# Patient Record
Sex: MALE | Race: BLACK OR AFRICAN AMERICAN | Employment: UNEMPLOYED | URBAN - METROPOLITAN AREA
[De-identification: names, ages, dates, MRNs, and addresses within clinical notes are randomized per-mention and may not be internally consistent; named-entity substitution may affect disease eponyms.]

---

## 2019-05-17 ENCOUNTER — HOSPITAL ENCOUNTER (EMERGENCY)
Age: 59
Discharge: HOME | End: 2019-05-17

## 2019-05-17 VITALS
TEMPERATURE: 98 F | WEIGHT: 140 LBS | BODY MASS INDEX: 21.97 KG/M2 | HEIGHT: 67 IN | OXYGEN SATURATION: 99 % | HEART RATE: 90 BPM | RESPIRATION RATE: 14 BRPM | SYSTOLIC BLOOD PRESSURE: 168 MMHG | DIASTOLIC BLOOD PRESSURE: 80 MMHG

## 2019-05-17 DIAGNOSIS — K02.9 PAIN DUE TO DENTAL CARIES: Primary | ICD-10-CM

## 2019-05-17 PROCEDURE — 99282 EMERGENCY DEPT VISIT SF MDM: CPT

## 2019-05-17 ASSESSMENT — PAIN SCALES - GENERAL: PAINLEVEL_OUTOF10: 8

## 2019-05-17 ASSESSMENT — PAIN DESCRIPTION - FREQUENCY: FREQUENCY: CONTINUOUS

## 2019-05-17 ASSESSMENT — PAIN DESCRIPTION - PAIN TYPE: TYPE: ACUTE PAIN

## 2019-05-17 ASSESSMENT — PAIN DESCRIPTION - PROGRESSION: CLINICAL_PROGRESSION: GRADUALLY WORSENING

## 2019-05-17 ASSESSMENT — PAIN DESCRIPTION - LOCATION: LOCATION: TEETH

## 2019-05-17 ASSESSMENT — PAIN DESCRIPTION - DESCRIPTORS: DESCRIPTORS: ACHING

## 2019-05-17 ASSESSMENT — PAIN DESCRIPTION - ORIENTATION: ORIENTATION: RIGHT;UPPER

## 2019-05-17 NOTE — ED PROVIDER NOTES
reviewed. Physical exam:  Constitutional: The patient is comfortable, alert and oriented x3, well appearing, non toxic in NAD. Head: Atraumatic and normocephalic. Eyes: No discharge from the eyes the sclerae are normal.  ENT: The oropharynx is normal. No pharyngeal erythema, uvular edema, tonsillar exudates, asymmetry or trismus. Mouth is normal to inspection  With the exception of a pain on percussion of the tooth noted above. There is no evidence of facial asymmetry or abscess formation. Floor of the mouth is soft. No tenderness in the submental or submandibular space. No tongue elevation. Has dental caries noted and stated tooth no evidence of abscess formation. Neck: The neck demonstrates normal range of motion. No meningeals signs are present. No stridor. Respiratory/chest: The chest is nontender. Breath sounds are normal. no respiratory distress is noted  Cardiovascular: Heart shows a regular rate and rhythm no murmurs no rubs no gallops. Skin: The skin exam shows no evidence of rashes  Neuro: GCS is 15  Lymphatic: No cervical lymphadenopathy       -------------------------------------------------- RESULTS -------------------------------------------------  I have personally reviewed all laboratory and imaging results for this patient. Results are listed below. LABS:  No results found for this visit on 05/17/19. RADIOLOGY:  Interpreted by Radiologist.  No orders to display       Dental clinic was called patient will be sent over for evaluation.     Medical Decision Making: Exam and history c/w  dental pain without evidence of gross infection. At this time we will  the patient on following up in dental clinic and provide pain relief.       Impression:   1) Dental Pain  2) Dental Caries    Disposition: Discharge  Condition: Stable               Aubrie Andrews, YANICK - CNP  05/17/19 0813

## 2021-01-28 LAB
MEASLES IMMUNE (IGG): NORMAL
MUMPS AB IGG: NORMAL
RUBELLA ANTIBODY IGG: NORMAL

## 2021-01-29 LAB — VARICELLA-ZOSTER VIRUS AB, IGG: NORMAL

## 2021-09-17 ENCOUNTER — HOSPITAL ENCOUNTER (OUTPATIENT)
Age: 61
Discharge: HOME | End: 2021-09-19
Payer: MEDICAID

## 2021-09-17 ENCOUNTER — HOSPITAL ENCOUNTER (OUTPATIENT)
Dept: GENERAL RADIOLOGY | Age: 61
Discharge: HOME | End: 2021-09-19
Payer: COMMERCIAL

## 2021-09-17 ENCOUNTER — HOSPITAL ENCOUNTER (OUTPATIENT)
Age: 61
Discharge: HOME | End: 2021-09-17
Payer: COMMERCIAL

## 2021-09-17 DIAGNOSIS — J44.9 OBSTRUCTIVE CHRONIC BRONCHITIS WITHOUT EXACERBATION (HCC): ICD-10-CM

## 2021-09-17 LAB
ALBUMIN SERPL-MCNC: 4.2 G/DL (ref 3.5–5.2)
ALP BLD-CCNC: 65 U/L (ref 40–129)
ALT SERPL-CCNC: 15 U/L (ref 0–40)
ANION GAP SERPL CALCULATED.3IONS-SCNC: 11 MMOL/L (ref 7–16)
AST SERPL-CCNC: 17 U/L (ref 0–39)
BASOPHILS ABSOLUTE: 0.02 E9/L (ref 0–0.2)
BASOPHILS RELATIVE PERCENT: 0.3 % (ref 0–2)
BILIRUB SERPL-MCNC: 0.8 MG/DL (ref 0–1.2)
BUN BLDV-MCNC: 11 MG/DL (ref 6–23)
CALCIUM SERPL-MCNC: 9.2 MG/DL (ref 8.6–10.2)
CHLORIDE BLD-SCNC: 103 MMOL/L (ref 98–107)
CHOLESTEROL, TOTAL: 163 MG/DL (ref 0–199)
CO2: 26 MMOL/L (ref 22–29)
CREAT SERPL-MCNC: 1.1 MG/DL (ref 0.7–1.2)
EOSINOPHILS ABSOLUTE: 0.15 E9/L (ref 0.05–0.5)
EOSINOPHILS RELATIVE PERCENT: 2.5 % (ref 0–6)
GFR AFRICAN AMERICAN: >60
GFR NON-AFRICAN AMERICAN: >60 ML/MIN/1.73
GLUCOSE BLD-MCNC: 84 MG/DL (ref 74–99)
HCT VFR BLD CALC: 43.2 % (ref 37–54)
HDLC SERPL-MCNC: 74 MG/DL
HEMOGLOBIN: 13.7 G/DL (ref 12.5–16.5)
IMMATURE GRANULOCYTES #: 0.02 E9/L
IMMATURE GRANULOCYTES %: 0.3 % (ref 0–5)
LDL CHOLESTEROL CALCULATED: 80 MG/DL (ref 0–99)
LYMPHOCYTES ABSOLUTE: 1.58 E9/L (ref 1.5–4)
LYMPHOCYTES RELATIVE PERCENT: 26.7 % (ref 20–42)
MCH RBC QN AUTO: 27.5 PG (ref 26–35)
MCHC RBC AUTO-ENTMCNC: 31.7 % (ref 32–34.5)
MCV RBC AUTO: 86.7 FL (ref 80–99.9)
MONOCYTES ABSOLUTE: 0.48 E9/L (ref 0.1–0.95)
MONOCYTES RELATIVE PERCENT: 8.1 % (ref 2–12)
NEUTROPHILS ABSOLUTE: 3.66 E9/L (ref 1.8–7.3)
NEUTROPHILS RELATIVE PERCENT: 62.1 % (ref 43–80)
PDW BLD-RTO: 12.4 FL (ref 11.5–15)
PLATELET # BLD: 278 E9/L (ref 130–450)
PMV BLD AUTO: 9 FL (ref 7–12)
POTASSIUM SERPL-SCNC: 3.7 MMOL/L (ref 3.5–5)
PROSTATE SPECIFIC ANTIGEN: 1.32 NG/ML (ref 0–4)
RBC # BLD: 4.98 E12/L (ref 3.8–5.8)
SODIUM BLD-SCNC: 140 MMOL/L (ref 132–146)
TOTAL PROTEIN: 7 G/DL (ref 6.4–8.3)
TRIGL SERPL-MCNC: 43 MG/DL (ref 0–149)
TSH SERPL DL<=0.05 MIU/L-ACNC: 1.39 UIU/ML (ref 0.27–4.2)
VITAMIN D 25-HYDROXY: 20 NG/ML (ref 30–100)
VLDLC SERPL CALC-MCNC: 9 MG/DL
WBC # BLD: 5.9 E9/L (ref 4.5–11.5)

## 2021-09-17 PROCEDURE — 84403 ASSAY OF TOTAL TESTOSTERONE: CPT

## 2021-09-17 PROCEDURE — 85025 COMPLETE CBC W/AUTO DIFF WBC: CPT

## 2021-09-17 PROCEDURE — G0103 PSA SCREENING: HCPCS

## 2021-09-17 PROCEDURE — 71046 X-RAY EXAM CHEST 2 VIEWS: CPT

## 2021-09-17 PROCEDURE — 84270 ASSAY OF SEX HORMONE GLOBUL: CPT

## 2021-09-17 PROCEDURE — 80053 COMPREHEN METABOLIC PANEL: CPT

## 2021-09-17 PROCEDURE — 80061 LIPID PANEL: CPT

## 2021-09-17 PROCEDURE — 82306 VITAMIN D 25 HYDROXY: CPT

## 2021-09-17 PROCEDURE — 84443 ASSAY THYROID STIM HORMONE: CPT

## 2021-09-17 PROCEDURE — 36415 COLL VENOUS BLD VENIPUNCTURE: CPT

## 2021-09-21 LAB
SEX HORMONE BINDING GLOBULIN: 74 NMOL/L (ref 11–80)
TESTOSTERONE FREE-NONMALE: 122.5 PG/ML (ref 47–244)
TESTOSTERONE TOTAL: 923 NG/DL (ref 220–1000)

## 2022-09-09 ENCOUNTER — HOSPITAL ENCOUNTER (OUTPATIENT)
Age: 62
Discharge: HOME | End: 2022-09-09
Payer: COMMERCIAL

## 2022-09-09 LAB
ALBUMIN SERPL-MCNC: 3.8 G/DL (ref 3.5–5.2)
ALP BLD-CCNC: 71 U/L (ref 40–129)
ALT SERPL-CCNC: 17 U/L (ref 0–40)
ANION GAP SERPL CALCULATED.3IONS-SCNC: 10 MMOL/L (ref 7–16)
AST SERPL-CCNC: 17 U/L (ref 0–39)
BASOPHILS ABSOLUTE: 0.02 E9/L (ref 0–0.2)
BASOPHILS RELATIVE PERCENT: 0.4 % (ref 0–2)
BILIRUB SERPL-MCNC: 0.5 MG/DL (ref 0–1.2)
BUN BLDV-MCNC: 11 MG/DL (ref 6–23)
CALCIUM SERPL-MCNC: 9.1 MG/DL (ref 8.6–10.2)
CHLORIDE BLD-SCNC: 102 MMOL/L (ref 98–107)
CHOLESTEROL, TOTAL: 140 MG/DL (ref 0–199)
CO2: 28 MMOL/L (ref 22–29)
CREAT SERPL-MCNC: 0.9 MG/DL (ref 0.7–1.2)
EOSINOPHILS ABSOLUTE: 0.19 E9/L (ref 0.05–0.5)
EOSINOPHILS RELATIVE PERCENT: 4.1 % (ref 0–6)
GFR AFRICAN AMERICAN: >60
GFR NON-AFRICAN AMERICAN: >60 ML/MIN/1.73
GLUCOSE BLD-MCNC: 90 MG/DL (ref 74–99)
HCT VFR BLD CALC: 41 % (ref 37–54)
HDLC SERPL-MCNC: 64 MG/DL
HEMOGLOBIN: 13 G/DL (ref 12.5–16.5)
IMMATURE GRANULOCYTES #: 0.01 E9/L
IMMATURE GRANULOCYTES %: 0.2 % (ref 0–5)
LDL CHOLESTEROL CALCULATED: 68 MG/DL (ref 0–99)
LYMPHOCYTES ABSOLUTE: 1.28 E9/L (ref 1.5–4)
LYMPHOCYTES RELATIVE PERCENT: 27.6 % (ref 20–42)
MCH RBC QN AUTO: 28 PG (ref 26–35)
MCHC RBC AUTO-ENTMCNC: 31.7 % (ref 32–34.5)
MCV RBC AUTO: 88.2 FL (ref 80–99.9)
MONOCYTES ABSOLUTE: 0.72 E9/L (ref 0.1–0.95)
MONOCYTES RELATIVE PERCENT: 15.6 % (ref 2–12)
NEUTROPHILS ABSOLUTE: 2.41 E9/L (ref 1.8–7.3)
NEUTROPHILS RELATIVE PERCENT: 52.1 % (ref 43–80)
PDW BLD-RTO: 11.9 FL (ref 11.5–15)
PLATELET # BLD: 302 E9/L (ref 130–450)
PMV BLD AUTO: 8.5 FL (ref 7–12)
POTASSIUM SERPL-SCNC: 4 MMOL/L (ref 3.5–5)
PROSTATE SPECIFIC ANTIGEN: 2.31 NG/ML (ref 0–4)
RBC # BLD: 4.65 E12/L (ref 3.8–5.8)
SODIUM BLD-SCNC: 140 MMOL/L (ref 132–146)
TOTAL PROTEIN: 6.7 G/DL (ref 6.4–8.3)
TRIGL SERPL-MCNC: 39 MG/DL (ref 0–149)
VITAMIN D 25-HYDROXY: 21 NG/ML (ref 30–100)
VLDLC SERPL CALC-MCNC: 8 MG/DL
WBC # BLD: 4.6 E9/L (ref 4.5–11.5)

## 2022-09-09 PROCEDURE — 82306 VITAMIN D 25 HYDROXY: CPT

## 2022-09-09 PROCEDURE — 85025 COMPLETE CBC W/AUTO DIFF WBC: CPT

## 2022-09-09 PROCEDURE — 36415 COLL VENOUS BLD VENIPUNCTURE: CPT

## 2022-09-09 PROCEDURE — 80061 LIPID PANEL: CPT

## 2022-09-09 PROCEDURE — G0103 PSA SCREENING: HCPCS

## 2022-09-09 PROCEDURE — 80053 COMPREHEN METABOLIC PANEL: CPT

## 2023-01-23 ENCOUNTER — HOSPITAL ENCOUNTER (OUTPATIENT)
Dept: GENERAL RADIOLOGY | Age: 63
Discharge: HOME | End: 2023-01-25
Payer: COMMERCIAL

## 2023-01-23 ENCOUNTER — HOSPITAL ENCOUNTER (OUTPATIENT)
Age: 63
Discharge: HOME | End: 2023-01-25
Payer: COMMERCIAL

## 2023-01-23 DIAGNOSIS — R52 PAIN: ICD-10-CM

## 2023-01-23 PROCEDURE — 73560 X-RAY EXAM OF KNEE 1 OR 2: CPT

## 2023-03-13 ENCOUNTER — HOSPITAL ENCOUNTER (OUTPATIENT)
Age: 63
Discharge: HOME | End: 2023-03-13
Payer: COMMERCIAL

## 2023-03-13 LAB
CHOLESTEROL, TOTAL: 150 MG/DL (ref 0–199)
HDLC SERPL-MCNC: 77 MG/DL
LDL CHOLESTEROL CALCULATED: 65 MG/DL (ref 0–99)
TRIGL SERPL-MCNC: 38 MG/DL (ref 0–149)
VLDLC SERPL CALC-MCNC: 8 MG/DL

## 2023-03-13 PROCEDURE — 82652 VIT D 1 25-DIHYDROXY: CPT

## 2023-03-13 PROCEDURE — 36415 COLL VENOUS BLD VENIPUNCTURE: CPT

## 2023-03-13 PROCEDURE — 80061 LIPID PANEL: CPT

## 2023-03-16 LAB — 1,25(OH)2D3 SERPL-MCNC: 36.4 PG/ML (ref 19.9–79.3)

## 2023-10-09 ENCOUNTER — HOSPITAL ENCOUNTER (OUTPATIENT)
Age: 63
Discharge: HOME | End: 2023-10-09
Payer: COMMERCIAL

## 2023-10-09 PROCEDURE — G0103 PSA SCREENING: HCPCS

## 2023-10-09 PROCEDURE — 36415 COLL VENOUS BLD VENIPUNCTURE: CPT

## 2023-10-10 LAB — PSA SERPL-MCNC: 2.43 NG/ML (ref 0–4)

## 2024-03-07 ENCOUNTER — HOSPITAL ENCOUNTER (OUTPATIENT)
Dept: GENERAL RADIOLOGY | Age: 64
Discharge: HOME | End: 2024-03-09
Payer: COMMERCIAL

## 2024-03-07 ENCOUNTER — HOSPITAL ENCOUNTER (OUTPATIENT)
Age: 64
Discharge: HOME | End: 2024-03-09
Payer: COMMERCIAL

## 2024-03-07 DIAGNOSIS — R05.9 COUGH, UNSPECIFIED TYPE: ICD-10-CM

## 2024-03-07 PROCEDURE — 71046 X-RAY EXAM CHEST 2 VIEWS: CPT

## 2024-07-11 ENCOUNTER — HOSPITAL ENCOUNTER (EMERGENCY)
Age: 64
Discharge: HOME | End: 2024-07-11
Attending: EMERGENCY MEDICINE
Payer: COMMERCIAL

## 2024-07-11 ENCOUNTER — APPOINTMENT (OUTPATIENT)
Dept: ULTRASOUND IMAGING | Age: 64
End: 2024-07-11
Payer: COMMERCIAL

## 2024-07-11 VITALS
HEIGHT: 67 IN | RESPIRATION RATE: 16 BRPM | TEMPERATURE: 97.9 F | WEIGHT: 145 LBS | SYSTOLIC BLOOD PRESSURE: 166 MMHG | BODY MASS INDEX: 22.76 KG/M2 | DIASTOLIC BLOOD PRESSURE: 92 MMHG | OXYGEN SATURATION: 98 % | HEART RATE: 72 BPM

## 2024-07-11 DIAGNOSIS — M79.89 LEG SWELLING: Primary | ICD-10-CM

## 2024-07-11 PROCEDURE — 6370000000 HC RX 637 (ALT 250 FOR IP): Performed by: EMERGENCY MEDICINE

## 2024-07-11 PROCEDURE — 99284 EMERGENCY DEPT VISIT MOD MDM: CPT

## 2024-07-11 PROCEDURE — 93971 EXTREMITY STUDY: CPT

## 2024-07-11 RX ORDER — IBUPROFEN 800 MG/1
800 TABLET ORAL ONCE
Status: COMPLETED | OUTPATIENT
Start: 2024-07-11 | End: 2024-07-11

## 2024-07-11 RX ADMIN — IBUPROFEN 800 MG: 800 TABLET, FILM COATED ORAL at 07:39

## 2024-07-11 ASSESSMENT — PAIN DESCRIPTION - ORIENTATION: ORIENTATION: LEFT;POSTERIOR

## 2024-07-11 ASSESSMENT — PAIN DESCRIPTION - DESCRIPTORS: DESCRIPTORS: ACHING

## 2024-07-11 ASSESSMENT — PAIN SCALES - GENERAL
PAINLEVEL_OUTOF10: 8
PAINLEVEL_OUTOF10: 7

## 2024-07-11 ASSESSMENT — PAIN DESCRIPTION - LOCATION
LOCATION: LEG
LOCATION: KNEE

## 2024-07-11 ASSESSMENT — LIFESTYLE VARIABLES
HOW OFTEN DO YOU HAVE A DRINK CONTAINING ALCOHOL: 2-3 TIMES A WEEK
HOW MANY STANDARD DRINKS CONTAINING ALCOHOL DO YOU HAVE ON A TYPICAL DAY: 3 OR 4

## 2024-07-11 NOTE — ED PROVIDER NOTES
HPI:  7/11/24,   Time: 7:17 AM EDT         Koby Lee is a 63 y.o. male presenting to the ED for evaluation of pain and swelling in the left lower leg.  He states approximately 1 week ago he had an object come down on his left knee at work.  He developed some swelling but was not very concerned.  He was doing fine but then noticed that over the past day or 2 his entire left lower leg has become swollen.  Complains of pain in his calf and pain with weightbearing.  No other injuries.  No history of surgeries.  No history of trauma otherwise.    ROS:   Pertinent positives and negatives are stated within HPI, all other systems reviewed and are negative.  --------------------------------------------- PAST HISTORY ---------------------------------------------  Past Medical History:  has no past medical history on file.    Past Surgical History:  has no past surgical history on file.    Social History:  reports that he has been smoking. He has never used smokeless tobacco. He reports current alcohol use. He reports that he does not use drugs.    Family History: family history is not on file.     The patient’s home medications have been reviewed.    Allergies: Patient has no known allergies.    -------------------------------------------------- RESULTS -------------------------------------------------  All laboratory and radiology results have been personally reviewed by myself   LABS:  No results found for this visit on 07/11/24.    RADIOLOGY:  Interpreted by Radiologist.  Vascular duplex lower extremity venous left   Final Result   There is no evidence of deep vein thrombosis.             ------------------------- NURSING NOTES AND VITALS REVIEWED ---------------------------   The nursing notes within the ED encounter and vital signs as below have been reviewed.   BP (!) 166/92   Pulse 72   Temp 97.9 °F (36.6 °C) (Oral)   Resp 16   Ht 1.702 m (5' 7\")   Wt 65.8 kg (145 lb)   SpO2 98%   BMI 22.71 kg/m²

## 2024-07-11 NOTE — DISCHARGE INSTR - COC
Continuity of Care Form    Patient Name: Koby Lee   :  1960  MRN:  [id number]    Admit date:  2024  Discharge date:  ***    Code Status Order: No Order   Advance Directives:     Admitting Physician:  No admitting provider for patient encounter.  PCP: Gurvinder Barnett MD    Discharging Nurse: ***  Discharging Hospital Unit/Room#:   Discharging Unit Phone Number: ***    Emergency Contact:   Extended Emergency Contact Information  Primary Emergency Contact: Colleen Baum  Mobile Phone: [phone]  Relation: Brother/Sister    Past Surgical History:  No past surgical history on file.    Immunization History:     There is no immunization history on file for this patient.    Active Problems:  There is no problem list on file for this patient.      Isolation/Infection:   Isolation            No Isolation          Patient Infection Status       None to display            Nurse Assessment:  Last Vital Signs: BP (!) 166/92   Pulse 72   Temp 97.9 °F (36.6 °C) (Oral)   Resp 16   Ht 1.702 m (5' 7\")   Wt 65.8 kg (145 lb)   SpO2 98%   BMI 22.71 kg/m²     Last documented pain score (0-10 scale): Pain Level: 7  Last Weight:   Wt Readings from Last 1 Encounters:   24 65.8 kg (145 lb)     Mental Status:  {IP PT MENTAL STATUS:}    IV Access:  { MERE IV ACCESS:012771938}    Nursing Mobility/ADLs:  Walking   {CHP DME ADLs:746825295}  Transfer  {CHP DME ADLs:828508262}  Bathing  {CHP DME ADLs:417459996}  Dressing  {CHP DME ADLs:085945165}  Toileting  {CHP DME ADLs:849082578}  Feeding  {CHP DME ADLs:305732668}  Med Admin  {P DME ADLs:643436611}  Med Delivery   { MERE MED Delivery:704668243}    Wound Care Documentation and Therapy:        Elimination:  Continence:   Bowel: {YES / NO:}  Bladder: {YES / NO:}  Urinary Catheter: {Urinary Catheter:310642092}   Colostomy/Ileostomy/Ileal Conduit: {YES / NO:}       Date of Last BM: ***  No intake or output data in the 24 hours  Level of Care:50212} for {GREATER/LESS:355267581} 30 days.     Update Admission H&P: {CHP DME Changes in HandP:950328173}    PHYSICIAN SIGNATURE:  {Esignature:116973250}

## 2024-07-15 ENCOUNTER — HOSPITAL ENCOUNTER (OUTPATIENT)
Age: 64
Discharge: HOME | End: 2024-07-17
Payer: COMMERCIAL

## 2024-07-15 ENCOUNTER — HOSPITAL ENCOUNTER (OUTPATIENT)
Age: 64
Discharge: HOME | End: 2024-07-15
Payer: COMMERCIAL

## 2024-07-15 LAB
BASOPHILS # BLD: 0.02 K/UL (ref 0–0.2)
BASOPHILS NFR BLD: 0 % (ref 0–2)
EOSINOPHIL # BLD: 0.18 K/UL (ref 0.05–0.5)
EOSINOPHILS RELATIVE PERCENT: 3 % (ref 0–6)
ERYTHROCYTE [DISTWIDTH] IN BLOOD BY AUTOMATED COUNT: 11.9 % (ref 11.5–15)
HCT VFR BLD AUTO: 40 % (ref 37–54)
HGB BLD-MCNC: 12.5 G/DL (ref 12.5–16.5)
IMM GRANULOCYTES # BLD AUTO: <0.03 K/UL (ref 0–0.58)
IMM GRANULOCYTES NFR BLD: 0 % (ref 0–5)
LYMPHOCYTES NFR BLD: 1.62 K/UL (ref 1.5–4)
LYMPHOCYTES RELATIVE PERCENT: 27 % (ref 20–42)
MCH RBC QN AUTO: 28 PG (ref 26–35)
MCHC RBC AUTO-ENTMCNC: 31.3 G/DL (ref 32–34.5)
MCV RBC AUTO: 89.5 FL (ref 80–99.9)
MONOCYTES NFR BLD: 0.76 K/UL (ref 0.1–0.95)
MONOCYTES NFR BLD: 13 % (ref 2–12)
NEUTROPHILS NFR BLD: 57 % (ref 43–80)
NEUTS SEG NFR BLD: 3.49 K/UL (ref 1.8–7.3)
PLATELET # BLD AUTO: 281 K/UL (ref 130–450)
PMV BLD AUTO: 8.6 FL (ref 7–12)
RBC # BLD AUTO: 4.47 M/UL (ref 3.8–5.8)
URATE SERPL-MCNC: 4.1 MG/DL (ref 3.4–7)
WBC OTHER # BLD: 6.1 K/UL (ref 4.5–11.5)

## 2024-07-15 PROCEDURE — 84550 ASSAY OF BLOOD/URIC ACID: CPT

## 2024-07-15 PROCEDURE — 85025 COMPLETE CBC W/AUTO DIFF WBC: CPT

## 2024-07-18 ENCOUNTER — HOSPITAL ENCOUNTER (OUTPATIENT)
Age: 64
Discharge: HOME | End: 2024-07-20
Payer: COMMERCIAL

## 2024-07-18 ENCOUNTER — HOSPITAL ENCOUNTER (OUTPATIENT)
Dept: GENERAL RADIOLOGY | Age: 64
Discharge: HOME | End: 2024-07-20
Payer: COMMERCIAL

## 2024-07-18 DIAGNOSIS — M25.562 LEFT KNEE PAIN, UNSPECIFIED CHRONICITY: ICD-10-CM

## 2024-07-18 PROCEDURE — 73562 X-RAY EXAM OF KNEE 3: CPT

## 2024-08-23 ENCOUNTER — HOSPITAL ENCOUNTER (OUTPATIENT)
Dept: PHYSICAL THERAPY | Age: 64
Setting detail: THERAPIES SERIES
Discharge: HOME | End: 2024-08-23
Payer: COMMERCIAL

## 2024-08-23 ENCOUNTER — OFFICE VISIT (OUTPATIENT)
Dept: ORTHOPEDIC SURGERY | Age: 64
End: 2024-08-23

## 2024-08-23 VITALS — HEIGHT: 67 IN | BODY MASS INDEX: 22.76 KG/M2 | WEIGHT: 145 LBS

## 2024-08-23 DIAGNOSIS — S86.812A STRAIN OF CALF MUSCLE, LEFT, INITIAL ENCOUNTER: ICD-10-CM

## 2024-08-23 DIAGNOSIS — M25.462 EFFUSION OF LEFT KNEE: Primary | ICD-10-CM

## 2024-08-23 DIAGNOSIS — M25.562 ACUTE PAIN OF LEFT KNEE: ICD-10-CM

## 2024-08-23 PROCEDURE — 97161 PT EVAL LOW COMPLEX 20 MIN: CPT | Performed by: PHYSICAL THERAPIST

## 2024-08-23 ASSESSMENT — ENCOUNTER SYMPTOMS
ABDOMINAL DISTENTION: 0
ALLERGIC/IMMUNOLOGIC NEGATIVE: 1
SHORTNESS OF BREATH: 0
EYE DISCHARGE: 0

## 2024-08-23 ASSESSMENT — PAIN SCALES - GENERAL: PAINLEVEL_OUTOF10: 4

## 2024-08-23 ASSESSMENT — PAIN DESCRIPTION - ORIENTATION: ORIENTATION: LEFT

## 2024-08-23 ASSESSMENT — PAIN DESCRIPTION - PAIN TYPE: TYPE: ACUTE PAIN

## 2024-08-23 ASSESSMENT — PAIN DESCRIPTION - DESCRIPTORS: DESCRIPTORS: ACHING;BURNING

## 2024-08-23 ASSESSMENT — PAIN DESCRIPTION - LOCATION: LOCATION: KNEE

## 2024-08-23 NOTE — PROGRESS NOTES
Physical Therapy: Initial Evaluation    Patient: Koby Lee (64 y.o. male)   Examination Date: 2024  Plan of Care Certification Period: 2024 to        :  1960 ;    Confirmed: Yes MRN: [id number]  CSN: [id number]   Insurance: Payor: [employer] CMS / Plan: Sitemasher / Product Type: *No Product type* /   Insurance ID: [id number] - (Worker's Comp) Secondary Insurance (if applicable):    Referring Physician: Ha Rivas DO     PCP: Gurvinder Barnett MD Visits to Date/Visits Approved:     No Show/Cancelled Appts:   /       Medical Diagnosis: No admission diagnoses are documented for this encounter.    Treatment Diagnosis:       PERTINENT MEDICAL HISTORY           Medical History: Chart Reviewed: Yes History reviewed. No pertinent past medical history.  Surgical History: History reviewed. No pertinent surgical history.    Medications: No current outpatient medications on file.  Allergies: Patient has no known allergies.      SUBJECTIVE EXAMINATION      ,           Subjective History: Onset Date: 24  Subjective: pt presents to therapy with c/o L knee pain due to being struck on the inside of the leg by falling lid 24; x-ray done - for fx, + for mild degenerative changes seen within the left knee with moderate-sized joint  effusion; no PMH for L knee injury/sx per pt; MEDS helping with pain; tells PT that symptoms have decreased over time; no c/o N/T across L LE but has issue with buckling at times; sleep seems fine; currently off work; ortho consult today and surgeon agreeable with PT course of action; RTD for follow-up 24  Additional Pertinent Hx (if applicable):            Learning/Language: Learning  Does the patient/guardian have any barriers to learning?: No barriers  What is the preferred language of the patient/guardian?: English     Pain Screening    Pain Screening  Patient Currently in Pain: Yes  Pain Assessment: 0-10  Pain Level: 4  Pain Type: Acute pain  Pain

## 2024-08-23 NOTE — PROGRESS NOTES
Koby Lee (:  1960) is a 64 y.o. male,New patient, here for evaluation of the following chief complaint(s):  Knee Pain (Left Knee, [facility] injury DOI 2024, Was seen in ER, states a lid hit him on the medial side of the knee.  Has seen Occupational Health)      Assessment & Plan   ASSESSMENT/PLAN:  1. Effusion of left knee  2. Acute pain of left knee  3. Strain of calf muscle, left, initial encounter    This is a 64 y.o. year old male with Effusion of left knee [M25.462].  I discussed a variety of treatment options with the patient today including observation, NSAID, low impact exercise, weight loss, physical therapy and injections. The patient would like to proceed with injection to help with pain and swelling.    C9 request for cortisone injection    Agree with initiation of PT which is to start this afternoon    Return for injection when approved.         Subjective   SUBJECTIVE/OBJECTIVE:  HPI    64-year-old male here today to discuss his left knee.  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  He states one of the lids which weighed approximately 20 to 30 pounds slid and hit the inside of his left leg.  At that time he had fairly significant pain.  He was seen in the emergency department where x-rays were obtained which I will discuss below.  He notes some improvement in his pain but continues to have medial sided knee pain as well as calf pain.  He notes swelling compared to the opposite side.  He states he is due to start physical therapy this afternoon.  He is here today to discuss further treatment options.    History reviewed. No pertinent past medical history.  History reviewed. No pertinent surgical history.   History reviewed. No pertinent family history.   Social History     Tobacco Use    Smoking status: Every Day     Current packs/day: 1.00     Types: Cigarettes    Smokeless tobacco: Never   Substance Use Topics    Alcohol use: Yes

## 2024-08-23 NOTE — PROGRESS NOTES
[facility]                Phone: [phone]   Fax: [phone]    Physical Therapy Daily Treatment Note  Date:  2024    Patient Name:  Koby Lee    :  1960  MRN: [id number]    Evaluating therapist:  ADIEL Watters     (24)  Restrictions/Precautions:    Diagnosis:  L knee sp/st  Treatment Diagnosis:    Insurance/Certification information:  [employer]  (24)  Referring Physician:  MADELINE Rivas  Plan of care signed (Y/N):    Visit# / total visits:    Pain level: 4/10   Time In:  Time Out:    Subjective:      Exercises:  Exercise/Equipment Resistance/Repetitions Other comments   bike              seated hip abd                       flex            Total Gym squats with ball            NK flex/ext            toe raises     step ups             side                                                         Other Therapeutic Activities:      Home Exercise Program:  provided 24    Manual Treatments:      Modalities:  IFC/MH to L knee PRN     Timed Code Treatment Minutes:      Total Treatment Minutes:      Treatment/Activity Tolerance:  [] Patient tolerated treatment well [] Patient limited by fatique  [] Patient limited by pain  [] Patient limited by other medical complications  [] Other:     Prognosis: [] Good [] Fair  [] Poor    Patient Requires Follow-up: [] Yes  [] No    Plan:   [] Continue per plan of care [] Alter current plan (see comments)  [] Plan of care initiated [] Hold pending MD visit [] Discharge  Plan for Next Session:      See Weekly Progress Note: []  Yes  []  No  Next due:               Time-in Time-out Total Time   82690  Evaluation Low Complexity  1503  1529  26   89207  Evaluation Med Complexity         34737  Evaluation High Complexity         82244  Cervical traction         72247  Ther Ex      45585  Neuro Re-ed        72914  Ther Activities        40177  Manual Therapy       31238  E-stim       09524  Ultrasound         32870

## 2024-08-26 ENCOUNTER — HOSPITAL ENCOUNTER (OUTPATIENT)
Dept: PHYSICAL THERAPY | Age: 64
Setting detail: THERAPIES SERIES
Discharge: HOME | End: 2024-08-26
Payer: COMMERCIAL

## 2024-08-26 NOTE — PROGRESS NOTES
[facility]                Phone: [phone]  Fax: [phone]    Physical Therapy  Cancellation/No-show Note      Patient Name:  oKby Lee  :  1960   Date:  2024      For today's appointment patient:  [x]  Cancelled  []  Rescheduled appointment  []  No-show     Reason given by patient:  []  Patient ill  []  Conflicting appointment  []  No transportation    []  Conflict with work  []  No reason given  [x]  Other:       Comments:   Patient confused scheduled time for PT appt.  He is rescheduled for tomorrow morning.        Electronically signed by:  BALJEET WASHINGTON, JANE, PTA [id number]                                            Evaluating therapist:  ADIEL Watters    24  Referring Physician:  MADELINE Rivas  Diagnosis:  L knee sp/st  Restrictions/Precautions:    Insurance/Certification:  [employer]atan  24  Plan of care signed (Y/N):    Visit# / total visits:     Pain level:  /10     Time In:       Time Out:      Subjective:  Patient presents for first scheduled treatment session following initial evaluation.      He reports pain in L knee  /10      Exercises:  Exercise/Equipment Resistance/Repetitions Other comments   Recumbent bike  8 min L3           Step up fwd 2 x10  6\"                  side 2 x10  6\"         Standing HS curls 3 x10           Standing hip abd 2 x15 ea L/R    Standing hip/knee flex/ext 2 x15 ea L/R         Calf raises - wedge 3 x10              stretches - wedge 3 x20s                           Total Gym BL squats w/ ball 2 x15  L7           Seated hip abd 2 x15  ptb                       add 2 x15  ball                    Objective:   Ther. exercise/activity as listed per flow sheet above.        Assessment:  Patient performs exercises with good effort and pacing.       Evaluation Findings:    c/o L knee pain due to being struck on the inside of the leg by falling lid 24   c/o pain across L knee with all prolonged activities,

## 2024-08-27 ENCOUNTER — HOSPITAL ENCOUNTER (OUTPATIENT)
Dept: PHYSICAL THERAPY | Age: 64
Setting detail: THERAPIES SERIES
Discharge: HOME | End: 2024-08-27
Payer: COMMERCIAL

## 2024-08-27 PROCEDURE — 97110 THERAPEUTIC EXERCISES: CPT

## 2024-08-27 PROCEDURE — 97530 THERAPEUTIC ACTIVITIES: CPT

## 2024-08-27 NOTE — PROGRESS NOTES
[facility]                Phone: [phone]   Fax: [phone]    Physical Therapy Daily Treatment Note    Date:  2024    Patient Name:  Koby Lee    :  1960  MRN: [id number]    Evaluating therapist:  ADIEL Watters    24  Referring Physician:  MADELINE Rivas  Diagnosis:  L knee sp/st  Restrictions/Precautions:    Insurance/Certification:  [place]  24  Plan of care signed (Y/N):    Visit# / total visits:     Pain level: 0-4/10     Time In:     1125  Time Out:  1207    Subjective:  Patient presents for first scheduled treatment session following initial evaluation.  He reports pain in no pain in L knee at rest prior to session this morning.    He reports pain as much as 4/10 in the past few days.         Exercises:    Exercise/Equipment   Resistance/Repetitions   Comments     Recumbent bike    8 min L3             Rocker board balance shifts   20x ea 3-way               Step up fwd   2 x10  6\"                     side  nt               Standing HS curls   2 x10                 Standing hip abd   2 x10 ea L/R        Standing hip/knee flex/ext   2 x10 ea L/R               Calf raises - wedge   3 x10                 stretches - wedge  nt                                 Total Gym BL squats w/ ball   2 x15  L7             Standing TKE   2 x15  red flexband            NK  knee ext   2 x10  5#                       flex   2 x10  5#                        Objective:   Ther. exercise/activity as listed per flow sheet above.    No modalities.      Assessment:  Patient performs exercises with good effort and pacing.   No pain reported during or following exercises.      Evaluation Findings:    c/o L knee pain due to being struck on the inside of the leg by falling lid 24   c/o pain across L knee with all prolonged activities, 4/10   Palpation: discomfort noted across medial aspect of L knee into medial calf belly; palpable knot noted in medial belly of

## 2024-08-28 ENCOUNTER — HOSPITAL ENCOUNTER (OUTPATIENT)
Dept: PHYSICAL THERAPY | Age: 64
Setting detail: THERAPIES SERIES
Discharge: HOME | End: 2024-08-28
Payer: COMMERCIAL

## 2024-08-28 PROCEDURE — 97110 THERAPEUTIC EXERCISES: CPT

## 2024-08-28 PROCEDURE — 97530 THERAPEUTIC ACTIVITIES: CPT

## 2024-08-28 NOTE — PROGRESS NOTES
[facility]                Phone: [phone]   Fax: [phone]    Physical Therapy Daily Treatment Note    Date:  2024    Patient Name:  Koby Lee    :  1960  MRN: [id number]    Evaluating therapist:  ADIEL Watters    24  Referring Physician:  MADELINE Rivas  Diagnosis:  L knee sp/st  Restrictions/Precautions:    Insurance/Certification:  [place]  24  Plan of care signed (Y/N):    Visit# / total visits:   3/12  Pain level: 3-4/10     Time In:     1029  Time Out:   1114    Subjective:  Patient presents for second of two scheduled treatment sessions this week.    He reports pain 3-4/10 in L knee at rest prior to session this morning.      Exercises:    Exercise/Equipment   Resistance/Repetitions   Comments     Recumbent bike    8 min L3             [employer] board balance shifts   20x ea 3-way               Step up fwd   2 x10  6\"                     side  nt               Standing HS curls   3 x10   2#               Standing hip abd   2 x10 ea L/R  2#        Standing hip/knee flex/ext   2 x10 ea L/R  2#               Calf raises - wedge   3 x10                 stretches - wedge  nt                                 Total Gym BL squats w/ ball   2 x15  L7             Standing TKE   2 x15  red flexband            NK  knee ext   3 x10  5#                       flex   3 x10  5#                        Objective:   Ther. exercise/activity as listed per flow sheet above.    AROM L knee WNL  Strength L knee WFL all ranges.      Assessment:  Patient performs exercises with good effort and pacing.   Static/dynamic balance is GOOD   Endurance GOOD-    Evaluation Findings:    c/o L knee pain due to being struck on the inside of the leg by falling lid 24   c/o pain across L knee with all prolonged activities, 4/10   Palpation: discomfort noted across medial aspect of L knee into medial calf belly; palpable knot noted in medial belly of L gastroc   Gait:  independent w/ no deviations noted  AROM/strength L knee WFL all ranges  Static/dynamic balance is GOOD   Endurance FAIR+ for all prolonged activities     Goals:    Decrease pain across L knee with all prolonged activities, 0-2/10   Improve endurance for all prolonged activities to GOOD/GOOD+   Assure I with HEP for home management of condition     Home Exercise Program:  provided 8/23/24    Manual Treatments:  nt    Modalities:  nt    Treatment/Activity Tolerance:  [x] Patient tolerated treatment well [] Patient limited by fatigue  [] Patient limited by pain  [] Patient limited by other medical complications  [] Other:     Prognosis: [] Good [] Fair  [] Poor    Patient Requires Follow-up: [x] Yes  [] No    Plan:   [x] Continue per plan of care [] Alter current plan (see comments)  [] Plan of care initiated [] Hold pending MD visit [] Discharge    Plan for Next Session:       See Progress Note:  []  Yes  []  No          8/28/2024 Time-in Time-out Total Time Units   46486  Eval Low Complexity       70385  Eval Med Complexity       28091  Eval High Complexity       53596  Cervical traction       13680  Ther Ex 1030 1100 30 2   82218  Neuro Re-ed         98251  Ther Activities   1100 1115 15 1   06476  Manual Therapy        96640  E-stim        93357  Ultrasound       79586 Work Reconditioning                       Session Total 1030 1115  45  3          Electronically signed by:  BALJEET WASHINGTON ATC, PTA [id number]                                          E

## 2024-09-04 ENCOUNTER — HOSPITAL ENCOUNTER (OUTPATIENT)
Dept: PHYSICAL THERAPY | Age: 64
Setting detail: THERAPIES SERIES
Discharge: HOME | End: 2024-09-04
Payer: COMMERCIAL

## 2024-09-04 PROCEDURE — 97110 THERAPEUTIC EXERCISES: CPT

## 2024-09-04 PROCEDURE — 97530 THERAPEUTIC ACTIVITIES: CPT

## 2024-09-04 NOTE — PROGRESS NOTES
[facility]                Phone: [phone]   Fax: [phone]    Physical Therapy Daily Treatment Note    Date:  2024    Patient Name:  Koby Lee    :  1960  MRN: [id number]    Evaluating therapist:  ADIEL Watters    24  Referring Physician:  MADELINE Rivas  Diagnosis:  L knee sp/st  Restrictions/Precautions:    Insurance/Certification:  [employer]  24  Plan of care signed (Y/N):    Visit# / total visits:      Pain level: 4-5/10     Time In:     1027  Time Out:   1112    Subjective:  Patient presents for first of three scheduled treatment sessions this week.    He reports pain 4-5/10 in L knee at rest prior to session this morning.      Exercises:    Exercise/Equipment   Resistance/Repetitions   Comments     Recumbent bike    8 min L3             Rocker board balance shifts   20x ea 3-way               Step up fwd   2 x10  6\"                     side   2 x10  6\"               Standing HS curls   3 x10   2#               Standing hip abd   2 x15 ea L/R  2#        Standing hip/knee flex/ext   2 x15 ea L/R  2#               Calf raises - wedge   3 x10                                   Total Gym BL squats w/ ball   3 x15  L8             Standing TKE   2 x15  red flexband            NK  knee ext   3 x10  5#                       flex   3 x10  5#                        Objective:   Ther. exercise/activity as listed per flow sheet above.  No modalities.    AROM L knee WNL  Strength L knee WFL all ranges.      Assessment:  Patient performs exercises with good effort and pacing.   Static/dynamic balance is GOOD/GOOD+  Endurance GOOD-/GOOD    Evaluation Findings:    c/o L knee pain due to being struck on the inside of the leg by falling lid 24   c/o pain across L knee with all prolonged activities, 4/10   Palpation: discomfort noted across medial aspect of L knee into medial calf belly; palpable knot noted in medial belly of L gastroc   Gait:

## 2024-09-05 ENCOUNTER — HOSPITAL ENCOUNTER (OUTPATIENT)
Dept: PHYSICAL THERAPY | Age: 64
Setting detail: THERAPIES SERIES
Discharge: HOME | End: 2024-09-05
Payer: COMMERCIAL

## 2024-09-05 PROCEDURE — 97530 THERAPEUTIC ACTIVITIES: CPT

## 2024-09-05 PROCEDURE — 97110 THERAPEUTIC EXERCISES: CPT

## 2024-09-05 NOTE — PROGRESS NOTES
[facility]                Phone: [phone]   Fax: [phone]    Physical Therapy Daily Treatment Note    Date:  2024    Patient Name:  Koby Lee    :  1960  MRN: [id number]    Evaluating therapist:  ADIEL Watters    24  Referring Physician:  MADELINE Rivas  Diagnosis:  L knee sp/st  Restrictions/Precautions:    Insurance/Certification:  [employer]atan  24  Plan of care signed (Y/N):    Visit# / total visits:      Pain level:  2-6/10     Time In:     1030  Time Out:    1116    Subjective:  Patient presents for second of three scheduled treatment sessions this week.    He reports pain 2/10 in L knee at rest prior to session this morning and pain as much as 6/10 with activity.      Exercises:    Exercise/Equipment   Resistance/Repetitions   Comments     Recumbent bike    8 min L3             Rocker board balance shifts   20x ea 3-way               Step up fwd   2 x10  6\"                     side   2 x10  6\"               Standing HS curls   3 x10   2#               Standing hip abd   2 x15 ea L/R  2#        Standing hip/knee flex/ext   2 x15 ea L/R  2#               Calf raises - wedge   3 x10                                   Total Gym BL squats w/ ball   3 x15  L8             Standing TKE   2 x15  red flexband            NK  knee ext   3 x10  5#                       flex   3 x10  5#                        Objective:   Ther. exercise/activity as listed per flow sheet above.  No modalities.    AROM L knee WNL  Strength L knee WFL all ranges.      Assessment:  Patient performs exercises with good effort and pacing.   Static/dynamic balance is GOOD/GOOD+  Endurance GOOD-/GOOD    Evaluation Findings:    c/o L knee pain due to being struck on the inside of the leg by falling lid 24   c/o pain across L knee with all prolonged activities, 4/10   Palpation: discomfort noted across medial aspect of L knee into medial calf belly; palpable knot noted in  (4) rarely moist

## 2024-09-06 ENCOUNTER — HOSPITAL ENCOUNTER (OUTPATIENT)
Dept: PHYSICAL THERAPY | Age: 64
Setting detail: THERAPIES SERIES
Discharge: HOME | End: 2024-09-06
Payer: COMMERCIAL

## 2024-09-06 PROCEDURE — 97530 THERAPEUTIC ACTIVITIES: CPT

## 2024-09-06 PROCEDURE — 97110 THERAPEUTIC EXERCISES: CPT

## 2024-09-06 NOTE — PROGRESS NOTES
[facility]                Phone: [phone]   Fax: [phone]    Physical Therapy Daily Treatment Note    Date:  2024    Patient Name:  Koby Lee    :  1960  MRN: [id number]    Evaluating therapist:  ADILE Watters    24  Referring Physician:  MADELINE Rivas  Diagnosis:  L knee sp/st  Restrictions/Precautions:    Insurance/Certification:  [place]  24  Plan of care signed (Y/N):    Visit# / total visits:      Pain level:  4/10     Time In:    1032  Time Out:   1114     Subjective:  Patient presents for third of three scheduled treatment sessions this week.    He reports pain  4/10 in L knee at rest prior to session this morning.    Exercises:    Exercise/Equipment   Resistance/Repetitions   Comments     Recumbent bike    8 min L3             [employer] board balance shifts   20x ea 3-way               Step up fwd   2 x10  6\"                     side   2 x10  6\"               Standing HS curls   3 x10   2#               Standing hip abd   2 x15 ea L/R  2#        Standing hip/knee flex/ext   2 x15 ea L/R  2#               Calf raises - wedge   3 x10                                   Total Gym BL squats w/ ball   3 x15  L8             Standing TKE   2 x15  red flexband            NK  knee ext   3 x10  5#                       flex   3 x10  5#                        Objective:   Ther. exercise/activity as listed per flow sheet above.  No modalities.    AROM L knee WNL  Strength L knee WFL all ranges.      Assessment:  Patient performs exercises with good effort and pacing.   Static/dynamic balance is GOOD/GOOD+  Endurance  GOOD    Evaluation Findings:    c/o L knee pain due to being struck on the inside of the leg by falling lid 24   c/o pain across L knee with all prolonged activities, 4/10   Palpation: discomfort noted across medial aspect of L knee into medial calf belly; palpable knot noted in medial belly of L gastroc   Gait: independent w/ no

## 2024-09-09 ENCOUNTER — HOSPITAL ENCOUNTER (OUTPATIENT)
Dept: PHYSICAL THERAPY | Age: 64
Setting detail: THERAPIES SERIES
Discharge: HOME | End: 2024-09-09
Payer: COMMERCIAL

## 2024-09-09 PROCEDURE — G0283 ELEC STIM OTHER THAN WOUND: HCPCS

## 2024-09-09 PROCEDURE — 97110 THERAPEUTIC EXERCISES: CPT

## 2024-09-09 PROCEDURE — 97530 THERAPEUTIC ACTIVITIES: CPT

## 2024-09-11 ENCOUNTER — HOSPITAL ENCOUNTER (OUTPATIENT)
Dept: PHYSICAL THERAPY | Age: 64
Setting detail: THERAPIES SERIES
Discharge: HOME | End: 2024-09-11
Payer: COMMERCIAL

## 2024-09-11 PROCEDURE — 97530 THERAPEUTIC ACTIVITIES: CPT

## 2024-09-11 PROCEDURE — 97110 THERAPEUTIC EXERCISES: CPT

## 2024-09-12 ENCOUNTER — OFFICE VISIT (OUTPATIENT)
Dept: ORTHOPEDIC SURGERY | Age: 64
End: 2024-09-12

## 2024-09-12 DIAGNOSIS — S86.812A STRAIN OF CALF MUSCLE, LEFT, INITIAL ENCOUNTER: ICD-10-CM

## 2024-09-12 DIAGNOSIS — M17.12 PRIMARY OSTEOARTHRITIS OF LEFT KNEE: Primary | ICD-10-CM

## 2024-09-12 DIAGNOSIS — M25.462 EFFUSION OF LEFT KNEE: ICD-10-CM

## 2024-09-12 DIAGNOSIS — M25.562 ACUTE PAIN OF LEFT KNEE: ICD-10-CM

## 2024-09-12 RX ORDER — TRIAMCINOLONE ACETONIDE 40 MG/ML
80 INJECTION, SUSPENSION INTRA-ARTICULAR; INTRAMUSCULAR ONCE
Status: COMPLETED | OUTPATIENT
Start: 2024-09-12 | End: 2024-09-12

## 2024-09-12 RX ADMIN — TRIAMCINOLONE ACETONIDE 80 MG: 40 INJECTION, SUSPENSION INTRA-ARTICULAR; INTRAMUSCULAR at 10:27

## 2024-09-12 ASSESSMENT — ENCOUNTER SYMPTOMS
EYE DISCHARGE: 0
ABDOMINAL DISTENTION: 0
ALLERGIC/IMMUNOLOGIC NEGATIVE: 1
SHORTNESS OF BREATH: 0

## 2024-09-13 ENCOUNTER — APPOINTMENT (OUTPATIENT)
Dept: PHYSICAL THERAPY | Age: 64
End: 2024-09-13
Payer: COMMERCIAL

## 2024-09-17 ENCOUNTER — HOSPITAL ENCOUNTER (OUTPATIENT)
Dept: PHYSICAL THERAPY | Age: 64
Setting detail: THERAPIES SERIES
Discharge: HOME | End: 2024-09-17
Payer: COMMERCIAL

## 2024-09-17 PROCEDURE — 97110 THERAPEUTIC EXERCISES: CPT

## 2024-09-17 PROCEDURE — 97530 THERAPEUTIC ACTIVITIES: CPT

## 2024-09-18 ENCOUNTER — HOSPITAL ENCOUNTER (OUTPATIENT)
Dept: PHYSICAL THERAPY | Age: 64
Setting detail: THERAPIES SERIES
Discharge: HOME | End: 2024-09-18
Payer: COMMERCIAL

## 2024-09-18 PROCEDURE — 97530 THERAPEUTIC ACTIVITIES: CPT

## 2024-09-18 PROCEDURE — 97110 THERAPEUTIC EXERCISES: CPT

## 2024-09-20 ENCOUNTER — HOSPITAL ENCOUNTER (OUTPATIENT)
Dept: PHYSICAL THERAPY | Age: 64
Setting detail: THERAPIES SERIES
Discharge: HOME | End: 2024-09-20
Payer: COMMERCIAL

## 2024-09-20 PROCEDURE — 97110 THERAPEUTIC EXERCISES: CPT

## 2024-09-20 PROCEDURE — 97530 THERAPEUTIC ACTIVITIES: CPT

## 2024-09-24 ENCOUNTER — HOSPITAL ENCOUNTER (OUTPATIENT)
Dept: PHYSICAL THERAPY | Age: 64
Setting detail: THERAPIES SERIES
Discharge: HOME | End: 2024-09-24
Payer: COMMERCIAL

## 2024-09-24 PROCEDURE — 97530 THERAPEUTIC ACTIVITIES: CPT

## 2024-09-24 PROCEDURE — 97110 THERAPEUTIC EXERCISES: CPT

## 2024-09-25 ENCOUNTER — HOSPITAL ENCOUNTER (OUTPATIENT)
Dept: PHYSICAL THERAPY | Age: 64
Setting detail: THERAPIES SERIES
Discharge: HOME | End: 2024-09-25
Payer: COMMERCIAL

## 2024-09-25 PROCEDURE — 97530 THERAPEUTIC ACTIVITIES: CPT

## 2024-09-25 PROCEDURE — 97110 THERAPEUTIC EXERCISES: CPT

## 2024-09-27 ENCOUNTER — HOSPITAL ENCOUNTER (OUTPATIENT)
Dept: PHYSICAL THERAPY | Age: 64
Setting detail: THERAPIES SERIES
Discharge: HOME | End: 2024-09-27
Payer: COMMERCIAL

## 2024-09-27 PROCEDURE — 97530 THERAPEUTIC ACTIVITIES: CPT

## 2024-09-27 PROCEDURE — 97110 THERAPEUTIC EXERCISES: CPT

## 2024-09-30 ENCOUNTER — HOSPITAL ENCOUNTER (OUTPATIENT)
Dept: PHYSICAL THERAPY | Age: 64
Setting detail: THERAPIES SERIES
Discharge: HOME | End: 2024-09-30
Payer: COMMERCIAL

## 2024-09-30 PROCEDURE — 97530 THERAPEUTIC ACTIVITIES: CPT

## 2024-09-30 PROCEDURE — 97110 THERAPEUTIC EXERCISES: CPT

## 2024-09-30 NOTE — PROGRESS NOTES
[facility]                Phone: [phone]   Fax: [phone]    Physical Therapy Daily Treatment Note    Date:  2024    Patient Name:  Koby Lee    :  1960  MRN: [id number]    Evaluating therapist:  ADIEL Watters    24  Referring Physician:  MADELINE Rivas  Diagnosis:  L knee sp/st  Restrictions/Precautions:    Insurance/Certification:  [place]  10/10/24  Plan of care signed (Y/N):    Visit# / total visits:    15/20  Pain level:     0-4/10     Time In:    1100  Time Out:  1144    RTP  10/4/24    Subjective:  Patient presents for first of three scheduled treatment sessions this week.  He reports no  pain in L knee 0/10 at rest prior to session this morning.  He states pain was worse this past Saturday and into .  He describes a pain like a tooth ache.  He reports it was painful when he first got up this morning, 3/10.         Exercises:    Exercise/Equipment   Resistance/Repetitions   Comments     Step One   8 min              [employer] board balance shifts   20x ea 3-way               Step up fwd   2 x10  6\"                     side   2 x10  6\"               Standing HS curls   3 x10   2#               Standing hip abd   2 x15 ea L/R  2#        Standing hip/knee flex/ext   2 x15 ea L/R  2#               Calf raises - wedge   3 x10                                   Total Gym BL squats w/ ball   3 x15  L8             Standing TKE   2 x20  green flexband            NK  knee ext   3 x10  7.5#                       flex   3 x10  7.5#                        Objective:   Ther. exercise/activity as listed per flow sheet above.  No modalties.    AROM L knee WNL  Strength L knee grossly  4+/5 flex/ext    Strength L hip/ankle grossly 4+ to 5/5     Assessment:  Patient performs exercises with good effort and pacing.   Static/dynamic balance is GOOD+/NORMAL  Endurance  GOOD/GOOD+    Minimal effusion noted in L knee following session today.      Evaluation

## 2024-10-02 ENCOUNTER — HOSPITAL ENCOUNTER (OUTPATIENT)
Dept: PHYSICAL THERAPY | Age: 64
Setting detail: THERAPIES SERIES
Discharge: HOME | End: 2024-10-02
Payer: COMMERCIAL

## 2024-10-02 PROCEDURE — 97110 THERAPEUTIC EXERCISES: CPT

## 2024-10-02 PROCEDURE — 97530 THERAPEUTIC ACTIVITIES: CPT

## 2024-10-02 NOTE — PROGRESS NOTES
inside of the leg by falling lid 7/5/24   c/o pain across L knee with all prolonged activities, 4/10   Palpation: discomfort noted across medial aspect of L knee into medial calf belly; palpable knot noted in medial belly of L gastroc   Gait: independent w/ no deviations noted  AROM/strength L knee WFL all ranges  Static/dynamic balance is GOOD   Endurance FAIR+ for all prolonged activities     Goals:    Decrease pain across L knee with all prolonged activities, 0-2/10   Improve endurance for all prolonged activities to GOOD/GOOD+   Assure I with HEP for home management of condition     Home Exercise Program:  provided 8/23/24    Manual Treatments:  nt    Modalities:  nt    Treatment/Activity Tolerance:  [x] Patient tolerated treatment well [] Patient limited by fatigue  [] Patient limited by pain  [] Patient limited by other medical complications  [] Other:     Prognosis: [x] Good [] Fair  [] Poor    Patient Requires Follow-up: [x] Yes  [] No    Plan:   [x] Continue per plan of care [] Alter current plan (see comments)  [] Plan of care initiated [] Hold pending MD visit [] Discharge    Plan for Next Session:       See Progress Note:  []  Yes  []  No          10/2/2024 Time-in Time-out Total Time Units   00133  Eval Low Complexity       27937  Eval Med Complexity       64577  Eval High Complexity       80203  Cervical traction       80859  Ther Ex 1030 1058 28 2   24461  Neuro Re-ed         17654  Ther Activities   1058 1112 14 1   21680  Manual Therapy        85889  E-stim        77546  Ultrasound       59054 Work Reconditioning                       Session Total 1030 1112 42 3          Electronically signed by:  BALJEET WASHINGTON ATC, PTA [id number]                                          E

## 2024-10-04 ENCOUNTER — HOSPITAL ENCOUNTER (OUTPATIENT)
Dept: PHYSICAL THERAPY | Age: 64
Setting detail: THERAPIES SERIES
Discharge: HOME | End: 2024-10-04
Payer: COMMERCIAL

## 2024-10-04 NOTE — PROGRESS NOTES
[facility]                Phone: [phone]  Fax: [phone]    Physical Therapy  Cancellation/No-show Note      Patient Name:  Koby Lee  :  1960   Date:  10/4/2024    For today's appointment patient:  [x]  Cancelled  []  Rescheduled appointment  []  No-show     Reason given by patient:  []  Patient ill  []  Conflicting appointment  []  No transportation    []  Conflict with work  []  No reason given  []  Other:       Comments:  Follow up with LEO dyson today per patient.  Waiting to hear back from patient.          Electronically signed by:  BALJEET WASHINGTON ATC, PTA [id number]

## 2024-10-07 ENCOUNTER — TELEPHONE (OUTPATIENT)
Dept: ORTHOPEDIC SURGERY | Age: 64
End: 2024-10-07

## 2024-10-07 ENCOUNTER — HOSPITAL ENCOUNTER (OUTPATIENT)
Dept: PHYSICAL THERAPY | Age: 64
Setting detail: THERAPIES SERIES
Discharge: HOME | End: 2024-10-07
Payer: COMMERCIAL

## 2024-10-07 PROCEDURE — 97530 THERAPEUTIC ACTIVITIES: CPT

## 2024-10-07 PROCEDURE — 97110 THERAPEUTIC EXERCISES: CPT

## 2024-10-07 NOTE — TELEPHONE ENCOUNTER
Pt called in called, he said his physical therapy will completed this week, he would like to know what is the next step? Koby can be reached at [phone]

## 2024-10-07 NOTE — TELEPHONE ENCOUNTER
Patient was told to try therapy and return if symptoms worsen or fail to improve. If patient feels as if therapy has not helped, he is encouraged to schedule an appointment for re-evaluation with Dr. Soria.

## 2024-10-07 NOTE — PROGRESS NOTES
[facility]                Phone: [phone]   Fax: [phone]    Physical Therapy Daily Treatment Note    Date:  10/7/2024    Patient Name:  Koby Lee    :  1960  MRN: [id number]    Evaluating therapist:  ADIEL Watters    24  Referring Physician:  MADELINE Rivas  Diagnosis:  L knee sp/st  Restrictions/Precautions:    Insurance/Certification:  [place]  10/10/24  Plan of care signed (Y/N):    Visit# / total visits:      Pain level:     3/10     Time In:     1031  Time Out:    1113    RTP  10/4/24    Subjective:  Patient presents for first of three scheduled treatment sessions this week.    He reports no  pain in L knee  3/10 at rest prior to session this morning.    He had follow up with  doctor.  He states he has RTW date 24.      His end date for current C9 is 10/10/24.        He states he continues to have pain in knee at random times (3/10) that feels like a tooth ache.  He reports the pain usually goes away after 10-15 minutes.     Exercises:    Exercise/Equipment   Resistance/Repetitions   Comments     Step One   8 min              [employer] board balance shifts   20x ea 3-way               Step up fwd   2 x10  6\"                     side   2 x10  6\"               Standing HS curls   3 x10   2#               Standing hip abd   2 x15 ea L/R  2#        Standing hip/knee flex/ext   2 x15 ea L/R  2#               Calf raises - wedge   3 x10                                   Total Gym BL squats w/ ball   3 x15  L9             Standing TKE   2 x20  green flexband            NK  knee ext   3 x10    10#                       flex   3 x10    10#                         Objective:   Ther. exercise/activity as listed per flow sheet above.  No modalties.    Minimal effusion noted in L knee.  AROM  L knee  WNL  flex/ext  Strength L knee grossly 4+/5 flex/ext  Strength L hip grossly 4+ to 5/5 all ranges  Gait:  independent, no AD; no significant deviations

## 2024-10-09 ENCOUNTER — HOSPITAL ENCOUNTER (OUTPATIENT)
Dept: PHYSICAL THERAPY | Age: 64
Setting detail: THERAPIES SERIES
Discharge: HOME | End: 2024-10-09
Payer: COMMERCIAL

## 2024-10-09 PROCEDURE — 97110 THERAPEUTIC EXERCISES: CPT

## 2024-10-09 PROCEDURE — 97530 THERAPEUTIC ACTIVITIES: CPT

## 2024-10-09 NOTE — PROGRESS NOTES
[facility]                Phone: [phone]   Fax: [phone]    Physical Therapy Daily Treatment Note    Date:  10/9/2024    Patient Name:  Koby Lee    :  1960  MRN: [id number]    Evaluating therapist:  ADIEL Watters    24  Referring Physician:  MADELINE Rivas  Diagnosis:  L knee sp/st  Restrictions/Precautions:    Insurance/Certification:  [place]     Plan of care signed (Y/N):    Visit# / total visits:      Pain level:     0-3/10     Time In:     1030  Time Out:   1110      RTW   24    Subjective:  Patient presents for first of three scheduled treatment sessions this week.    He reports minimal to no pain in L knee  at rest prior to session this morning.  He reports pain as much as 3/10 since last session.     Exercises:    Exercise/Equipment   Resistance/Repetitions   Comments     Step One   8 min              Rocker board balance shifts   20x ea 3-way               Step up fwd   2 x15  6\"               Standing HS curls   3 x10   3#               Standing hip abd   2 x15 ea L/R  3#        Standing hip/knee flex/ext   2 x15 ea L/R  3#               Calf raises - wedge   2 x15                                  Total Gym BL squats w/ ball   3 x15  L9             Standing TKE   2 x20  green flexband            NK  knee ext   3 x10    10#                       flex   3 x10    10#                         Objective:   Ther. exercise/activity as listed per flow sheet above.  No modalties.        Assessment:  Patient performs exercises with good effort and pacing. Minimal cuing needed to maintain good mechanics and pacing throughout session today.      Evaluation Findings:    c/o L knee pain due to being struck on the inside of the leg by falling lid 24   c/o pain across L knee with all prolonged activities, 4/10   Palpation: discomfort noted across medial aspect of L knee into medial calf belly; palpable knot noted in medial belly of L gastroc

## 2024-10-11 ENCOUNTER — HOSPITAL ENCOUNTER (OUTPATIENT)
Dept: PHYSICAL THERAPY | Age: 64
Setting detail: THERAPIES SERIES
Discharge: HOME | End: 2024-10-11
Payer: COMMERCIAL

## 2024-10-11 ENCOUNTER — APPOINTMENT (OUTPATIENT)
Dept: PHYSICAL THERAPY | Age: 64
End: 2024-10-11
Payer: COMMERCIAL

## 2024-10-11 PROCEDURE — 97110 THERAPEUTIC EXERCISES: CPT

## 2024-10-11 PROCEDURE — 97530 THERAPEUTIC ACTIVITIES: CPT

## 2024-10-11 NOTE — PROGRESS NOTES
[facility]                Phone: [phone]   Fax: [phone]    Physical Therapy Daily Treatment Note    Date:  10/11/2024    Patient Name:  Koby Lee    :  1960  MRN: [id number]    Evaluating therapist:  ADIEL Watters    24  Referring Physician:  MADELINE Rivas  Diagnosis:  L knee sp/st  Restrictions/Precautions:    Insurance/Certification:  [place]     Plan of care signed (Y/N):    Visit# / total visits:      Pain level:      0-1/10     Time In:     1030  Time Out:   1110    RTW   24    Subjective:  Patient presents for third of three scheduled treatment sessions this week.   He reports minimal to no pain in L knee  at rest prior to session this morning.      Exercises:    Exercise/Equipment   Resistance/Repetitions   Comments     Step One   8 min              [employer] board balance shifts   20x ea 3-way               Step up fwd   2 x15  6\"               Standing HS curls   3 x10   3#               Standing hip abd   2 x15 ea L/R  3#        Standing hip/knee flex/ext   2 x15 ea L/R  3#               Calf raises - wedge   2 x15                                  Total Gym BL squats w/ ball   3 x15  L9             Standing TKE   2 x20  green flexband            NK  knee ext   3 x10    10#                       flex   3 x10    10#                         Objective:   Ther. exercise/activity as listed per flow sheet above.  No modalties.    Some very mild effusion remains in L knee following exercises today.    AROM  L knee  WNL  flex/ext  Strength L knee grossly 4+/5 flex/ext  Strength L hip grossly 4+ to 5/5 all ranges  Gait:  independent, no AD; no significant deviations noted       Assessment:  Patient performs exercises with good effort and pacing.   Static/dynamic balance is GOOD+/NORMAL  Endurance  GOOD+    He is progressing well toward all LTGs at this time.      Evaluation Findings:    c/o L knee pain due to being struck on the inside of the

## 2024-10-14 ENCOUNTER — HOSPITAL ENCOUNTER (OUTPATIENT)
Dept: PHYSICAL THERAPY | Age: 64
Setting detail: THERAPIES SERIES
Discharge: HOME | End: 2024-10-14
Payer: COMMERCIAL

## 2024-10-14 PROCEDURE — 97110 THERAPEUTIC EXERCISES: CPT

## 2024-10-14 PROCEDURE — 97530 THERAPEUTIC ACTIVITIES: CPT

## 2024-10-14 NOTE — PROGRESS NOTES
[facility]                Phone: [phone]   Fax: [phone]    Physical Therapy Daily Treatment Note    Date:  10/14/2024    Patient Name:  Koby Lee    :  1960  MRN: [id number]    Evaluating therapist:  ADIEL Watters    24  Referring Physician:  MADELINE Rivas  Diagnosis:  L knee sp/st  Restrictions/Precautions:    Insurance/Certification:  [place]     Plan of care signed (Y/N):    Visit# / total visits:      Pain level:       2/10     Time In:     1030  Time Out:   1110     RTW   24    Subjective:  Patient presents for first of three scheduled treatment sessions this week.   He reports pain 2/10 in L knee at rest prior to session this morning.      Exercises:    Exercise/Equipment   Resistance/Repetitions   Comments     Step One   8 min              [employer] board balance shifts   20x ea 3-way               Step up fwd   2 x15  6\"               Standing HS curls   3 x10   3#               Standing hip abd   2 x15 ea L/R  3#        Standing hip/knee flex/ext   2 x15 ea L/R  3#               Calf raises - wedge   2 x15                                  Total Gym BL squats w/ ball   3 x15  L9             Standing TKE   2 x20  green flexband            NK  knee ext   3 x10    10#                       flex   3 x10    10#                         Objective:   Ther. exercise/activity as listed per flow sheet above.  No modalties.    Some very mild effusion remains in L knee following exercises today.    AROM  L knee  WNL  flex/ext  Strength L knee grossly 4+ to 5/5 flex/ext  Strength L hip grossly  5/5 all ranges     Assessment:  Patient performs exercises with good effort and pacing.   Static/dynamic balance is GOOD+/NORMAL  Endurance  GOOD+    Evaluation Findings:    c/o L knee pain due to being struck on the inside of the leg by falling lid 24   c/o pain across L knee with all prolonged activities, 4/10   Palpation: discomfort noted across medial

## 2024-10-16 ENCOUNTER — HOSPITAL ENCOUNTER (OUTPATIENT)
Dept: PHYSICAL THERAPY | Age: 64
Setting detail: THERAPIES SERIES
Discharge: HOME | End: 2024-10-16
Payer: COMMERCIAL

## 2024-10-16 PROCEDURE — 97110 THERAPEUTIC EXERCISES: CPT

## 2024-10-16 PROCEDURE — 97530 THERAPEUTIC ACTIVITIES: CPT

## 2024-10-16 NOTE — PROGRESS NOTES
[facility]                Phone: [phone]   Fax: [phone]    Physical Therapy Daily Treatment Note    Date:  10/16/2024    Patient Name:  Koby Lee    :  1960  MRN: [id number]    Evaluating therapist:  ADIEL Watters    24  Referring Physician:  MADELINE Rivas  Diagnosis:  L knee sp/st  Restrictions/Precautions:    Insurance/Certification:  [place]     Plan of care signed (Y/N):    Visit# / total visits:      Pain level:       1-2/10       Time In:     1030  Time Out:   1111      RTW   24    Subjective:  Patient presents for second of three scheduled treatment sessions this week.  He reports pain 2/10 in L knee at rest prior to session this morning.      Exercises:    Exercise/Equipment   Resistance/Repetitions   Comments     Step One   8 min              Proxly board balance shifts   20x ea 3-way               Step up fwd   2 x15  6\"               Standing HS curls   3 x10   3#               Standing hip abd   2 x15 ea L/R  3#        Standing hip/knee flex/ext   2 x15 ea L/R  3#               Calf raises - wedge   2 x15                                  Total Gym BL squats w/ ball   3 x15  L9             Standing TKE   2 x20  green flexband            NK  knee ext   3 x10    10#                       flex   3 x10    10#                         Objective:   Ther. exercise/activity as listed per flow sheet above.  No modalties.    Some very mild effusion remains in L knee following exercises today.   AROM  L knee  WNL  flex/ext  Strength L knee grossly 4+ to 5/5 flex/ext  Strength L hip grossly  5/5 all ranges     Assessment:  Patient performs exercises with good effort and pacing.   Static/dynamic balance is GOOD+/NORMAL  Endurance  GOOD+    Evaluation Findings:    c/o L knee pain due to being struck on the inside of the leg by falling lid 24   c/o pain across L knee with all prolonged activities, 4/10   Palpation: discomfort noted across

## 2024-10-18 ENCOUNTER — HOSPITAL ENCOUNTER (OUTPATIENT)
Dept: PHYSICAL THERAPY | Age: 64
Setting detail: THERAPIES SERIES
Discharge: HOME | End: 2024-10-18
Payer: COMMERCIAL

## 2024-10-18 PROCEDURE — 97530 THERAPEUTIC ACTIVITIES: CPT

## 2024-10-18 PROCEDURE — 97110 THERAPEUTIC EXERCISES: CPT

## 2024-10-18 NOTE — PROGRESS NOTES
[facility]                Phone: [phone]   Fax: [phone]    Physical Therapy Daily Treatment Note    Date:  10/18/2024    Patient Name:  Koby Lee    :  1960  MRN: [id number]    Evaluating therapist:  AIDEL Watters    24  Referring Physician:  MADELINE Rivas  Diagnosis:  L knee sp/st  Restrictions/Precautions:    Insurance/Certification:  [place]     Plan of care signed (Y/N):    Visit# / total visits:      Pain level:      1-2/10       Time In:     1030  Time Out:   1110     RTW   24     Subjective:  Patient presents for third of three scheduled treatment sessions this week.  He reports pain 1-2/10 in L knee at rest prior to session this morning.      Exercises:    Exercise/Equipment   Resistance/Repetitions   Comments     Step One   8 min              [employer] board balance shifts   20x ea 3-way               Step up fwd   2 x15  8\"               Standing HS curls   3 x10   3#               Standing hip abd   2 x15 ea L/R  3#        Standing hip/knee flex/ext   2 x15 ea L/R  3#               Calf raises - wedge   2 x15                                  Total Gym BL squats w/ ball   3 x15  L9             Standing TKE   2 x20  green flexband            NK  knee ext   3 x10    10#                       flex   3 x10    10#                         Objective:   Ther. exercise/activity as listed per flow sheet above.  No modalties.    Minimal effusion noted in L knee following exercises  Step up height increased to 8\" from 6\" today.    AROM  L knee  WNL  flex/ext  Strength L knee grossly 4+ to 5/5 flex/ext  Strength L hip grossly  5/5 all ranges     Assessment:  Patient performs exercises with good effort and pacing.   Static/dynamic balance is GOOD+/NORMAL  Endurance  GOOD+    Evaluation Findings:    c/o L knee pain due to being struck on the inside of the leg by falling lid 24   c/o pain across L knee with all prolonged activities, 4/10

## 2024-10-21 ENCOUNTER — HOSPITAL ENCOUNTER (OUTPATIENT)
Dept: PHYSICAL THERAPY | Age: 64
Setting detail: THERAPIES SERIES
Discharge: HOME | End: 2024-10-21
Payer: COMMERCIAL

## 2024-10-21 PROCEDURE — 97530 THERAPEUTIC ACTIVITIES: CPT

## 2024-10-21 PROCEDURE — 97110 THERAPEUTIC EXERCISES: CPT

## 2024-10-21 NOTE — PROGRESS NOTES
[facility]                Phone: [phone]   Fax: [phone]    Physical Therapy Daily Treatment Note    Date:  10/21/2024    Patient Name:  Koby Lee    :  1960  MRN: [id number]    Evaluating therapist:  ADIEL Watters    24  Referring Physician:  MADELINE Rivas  Diagnosis:  L knee sp/st  Restrictions/Precautions:    Insurance/Certification:  [place]     Plan of care signed (Y/N):    Visit# / total visits:      Pain level:      1-2/10     Time In:      1030  Time Out:   1110    RTW   24     Subjective:  Patient presents for first of final two scheduled treatment sessions this week.    He reports pain 1-2/10 in L knee at rest prior to session this morning.      Exercises:    Exercise/Equipment   Resistance/Repetitions   Comments     Step One   8 min              CSD[employer] board balance shifts   20x ea 3-way               Step up fwd   2 x15  8\"               Standing HS curls   3 x10   3#               Standing hip abd   2 x15 ea L/R  3#        Standing hip/knee flex/ext   2 x15 ea L/R  3#               Calf raises - wedge   2 x15                                  Total Gym BL squats    3 x15  L9             Standing TKE   2 x20  green flexband            NK  knee ext   3 x10    10#                       flex   3 x10    10#                         Objective:   Ther. exercise/activity as listed per flow sheet above.  No modalties.      Assessment:  Patient performs exercises with good effort and pacing.   No increased pain reported following session.      Evaluation Findings:    c/o L knee pain due to being struck on the inside of the leg by falling lid 24   c/o pain across L knee with all prolonged activities, 4/10   Palpation: discomfort noted across medial aspect of L knee into medial calf belly; palpable knot noted in medial belly of L gastroc   Gait: independent w/ no deviations noted  AROM/strength L knee WFL all ranges  Static/dynamic

## 2024-10-23 ENCOUNTER — HOSPITAL ENCOUNTER (OUTPATIENT)
Dept: PHYSICAL THERAPY | Age: 64
Setting detail: THERAPIES SERIES
Discharge: HOME | End: 2024-10-23
Payer: COMMERCIAL

## 2024-10-23 PROCEDURE — 97530 THERAPEUTIC ACTIVITIES: CPT

## 2024-10-23 PROCEDURE — 97110 THERAPEUTIC EXERCISES: CPT

## 2024-10-23 NOTE — PROGRESS NOTES
[facility]                Phone: [phone]   Fax: [phone]      Physical Therapy  Treatment Summary       Date:  10/23/2024    Patient Name:  Koby Lee    :  1960  MRN: [id number]      PHYSICAL THERAPIST:  ADIEL Watters   REFERRING PHYSICIAN:  MADELINE Rivas  DIAGNOSIS:  L knee sp/st      ATTENDANCE:  Patient has attended 24 of 24 scheduled treatments from 24  to 10/23/24.    TREATMENTS RECEIVED:  Therapeutic exercise, therapeutic activity, balance/proprioceptive activities, postural awareness/positioning training, manual therapy, HEP, modalities.      INITIAL STATUS:  c/o L knee pain due to being struck on the inside of the leg by falling lid 24   c/o pain across L knee with all prolonged activities, 4/10   Palpation: discomfort noted across medial aspect of L knee into medial calf belly; palpable knot noted in medial belly of L gastroc   Gait: independent w/ no deviations noted  AROM/strength L knee WFL all ranges  Static/dynamic balance is GOOD   Endurance FAIR+ for all prolonged activities       FINAL STATUS:  c/o pain across L knee with all prolonged activities, 0-2/10   AROM  L knee  WNL  flex/ext  Strength L knee grossly 4+ to 5/5 flex/ext  Strength L hip grossly  5/5 all ranges  Static/dynamic balance is GOOD+/NORMAL  Endurance  GOOD+  Independent with HEP    GOALS:  3 out of 3 Long Term Goals were obtained.    LONG TERM GOALS OBTAINED/NOT OBTAINED:   Decrease pain across L knee with all prolonged activities, 0-2/10    ACHIEVED  Improve endurance for all prolonged activities to GOOD/GOOD+    ACHIEVED  Assure I with HEP for home management of condition    ACHIEVED      PATIENT EDUCATION/INSTRUCTIONS:  Patient instructed on and provided copy of HEP.             Electronically Signed by: BALJEET WASHINGTON ATC, PTA [id number]       10/23/2024    I have read the above summary and agree with all the content. Patient is discharged from PT care at this 
activities     Goals:    Decrease pain across L knee with all prolonged activities, 0-2/10   Improve endurance for all prolonged activities to GOOD/GOOD+   Assure I with HEP for home management of condition     Home Exercise Program:  provided 8/23/24    Manual Treatments:  nt    Modalities:  nt    Treatment/Activity Tolerance:  [x] Patient tolerated treatment well [] Patient limited by fatigue  [] Patient limited by pain  [] Patient limited by other medical complications  [] Other:     Prognosis: [x] Good [] Fair  [] Poor    Patient Requires Follow-up: [] Yes  [x] No    Plan:   [] Continue per plan of care [] Alter current plan (see comments)  [] Plan of care initiated [] Hold pending MD visit [x] Discharge    Plan for Next Session:   Continue POC with progressions per patient tolerance.      See Progress Note:  [x]  Yes  []  No          10/23/2024 Time-in Time-out Total Time Units   79189  Eval Low Complexity       60770  Eval Med Complexity       46062  Eval High Complexity       74934  Cervical traction       02030  Ther Ex 1031 1058 27 2   88842  Neuro Re-ed         60973  Ther Activities   1058 1111 13 1   71190  Manual Therapy        27739  E-stim        79442  Ultrasound       30531 Work Reconditioning                       Session Total 1031 1111 40 3          Electronically signed by:  BALJEET WASHINGTON ATC, PTA [id number]                                          E

## 2025-05-29 ENCOUNTER — HOSPITAL ENCOUNTER (OUTPATIENT)
Age: 65
Discharge: HOME | End: 2025-05-29
Payer: COMMERCIAL

## 2025-05-29 LAB
25(OH)D3 SERPL-MCNC: 61.4 NG/ML (ref 30–100)
ALBUMIN SERPL-MCNC: 4 G/DL (ref 3.5–5.2)
ALP SERPL-CCNC: 63 U/L (ref 40–129)
ALT SERPL-CCNC: 18 U/L (ref 0–50)
ANION GAP SERPL CALCULATED.3IONS-SCNC: 9 MMOL/L (ref 7–16)
AST SERPL-CCNC: 26 U/L (ref 0–50)
BASOPHILS # BLD: 0.01 K/UL (ref 0–0.2)
BASOPHILS NFR BLD: 0 % (ref 0–2)
BILIRUB SERPL-MCNC: 0.3 MG/DL (ref 0–1.2)
BUN SERPL-MCNC: 18 MG/DL (ref 8–23)
CALCIUM SERPL-MCNC: 9.3 MG/DL (ref 8.8–10.2)
CHLORIDE SERPL-SCNC: 102 MMOL/L (ref 98–107)
CHOLEST SERPL-MCNC: 136 MG/DL
CO2 SERPL-SCNC: 28 MMOL/L (ref 22–29)
CREAT SERPL-MCNC: 1 MG/DL (ref 0.7–1.2)
EOSINOPHIL # BLD: 0.15 K/UL (ref 0.05–0.5)
EOSINOPHILS RELATIVE PERCENT: 3 % (ref 0–6)
ERYTHROCYTE [DISTWIDTH] IN BLOOD BY AUTOMATED COUNT: 11.9 % (ref 11.5–15)
GFR, ESTIMATED: 81 ML/MIN/1.73M2
GLUCOSE SERPL-MCNC: 90 MG/DL (ref 74–99)
HCT VFR BLD AUTO: 39.7 % (ref 37–54)
HDLC SERPL-MCNC: 86 MG/DL
HGB BLD-MCNC: 12.5 G/DL (ref 12.5–16.5)
IMM GRANULOCYTES # BLD AUTO: <0.03 K/UL (ref 0–0.58)
IMM GRANULOCYTES NFR BLD: 0 % (ref 0–5)
LDLC SERPL CALC-MCNC: 41 MG/DL
LYMPHOCYTES NFR BLD: 1.59 K/UL (ref 1.5–4)
LYMPHOCYTES RELATIVE PERCENT: 32 % (ref 20–42)
MCH RBC QN AUTO: 27.5 PG (ref 26–35)
MCHC RBC AUTO-ENTMCNC: 31.5 G/DL (ref 32–34.5)
MCV RBC AUTO: 87.3 FL (ref 80–99.9)
MONOCYTES NFR BLD: 0.66 K/UL (ref 0.1–0.95)
MONOCYTES NFR BLD: 13 % (ref 2–12)
NEUTROPHILS NFR BLD: 51 % (ref 43–80)
NEUTS SEG NFR BLD: 2.55 K/UL (ref 1.8–7.3)
PLATELET # BLD AUTO: 286 K/UL (ref 130–450)
PMV BLD AUTO: 8.8 FL (ref 7–12)
POTASSIUM SERPL-SCNC: 4.2 MMOL/L (ref 3.5–5.1)
PROT SERPL-MCNC: 6.8 G/DL (ref 6.4–8.3)
PSA SERPL-MCNC: 1.65 NG/ML (ref 0–4)
RBC # BLD AUTO: 4.55 M/UL (ref 3.8–5.8)
SODIUM SERPL-SCNC: 138 MMOL/L (ref 136–145)
TRIGL SERPL-MCNC: 42 MG/DL
VLDLC SERPL CALC-MCNC: 8 MG/DL
WBC OTHER # BLD: 5 K/UL (ref 4.5–11.5)

## 2025-05-29 PROCEDURE — 80053 COMPREHEN METABOLIC PANEL: CPT

## 2025-05-29 PROCEDURE — G0103 PSA SCREENING: HCPCS

## 2025-05-29 PROCEDURE — 36415 COLL VENOUS BLD VENIPUNCTURE: CPT

## 2025-05-29 PROCEDURE — 80061 LIPID PANEL: CPT

## 2025-05-29 PROCEDURE — 82306 VITAMIN D 25 HYDROXY: CPT

## 2025-05-29 PROCEDURE — 85025 COMPLETE CBC W/AUTO DIFF WBC: CPT

## 2025-07-02 ENCOUNTER — HOSPITAL ENCOUNTER (EMERGENCY)
Age: 65
Discharge: HOME | End: 2025-07-02
Attending: STUDENT IN AN ORGANIZED HEALTH CARE EDUCATION/TRAINING PROGRAM
Payer: COMMERCIAL

## 2025-07-02 ENCOUNTER — APPOINTMENT (OUTPATIENT)
Dept: GENERAL RADIOLOGY | Age: 65
End: 2025-07-02
Payer: COMMERCIAL

## 2025-07-02 VITALS
BODY MASS INDEX: 22.76 KG/M2 | HEIGHT: 67 IN | TEMPERATURE: 97.5 F | SYSTOLIC BLOOD PRESSURE: 141 MMHG | HEART RATE: 75 BPM | RESPIRATION RATE: 13 BRPM | WEIGHT: 145 LBS | OXYGEN SATURATION: 93 % | DIASTOLIC BLOOD PRESSURE: 88 MMHG

## 2025-07-02 DIAGNOSIS — J44.1 COPD EXACERBATION (HCC): Primary | ICD-10-CM

## 2025-07-02 LAB
ALBUMIN SERPL-MCNC: 4.3 G/DL (ref 3.5–5.2)
ALP SERPL-CCNC: 62 U/L (ref 40–129)
ALT SERPL-CCNC: 20 U/L (ref 0–50)
ANION GAP SERPL CALCULATED.3IONS-SCNC: 11 MMOL/L (ref 7–16)
AST SERPL-CCNC: 22 U/L (ref 0–50)
BASOPHILS # BLD: 0.04 K/UL (ref 0–0.2)
BASOPHILS NFR BLD: 1 % (ref 0–2)
BILIRUB SERPL-MCNC: 0.5 MG/DL (ref 0–1.2)
BNP SERPL-MCNC: 264 PG/ML (ref 0–125)
BUN SERPL-MCNC: 10 MG/DL (ref 8–23)
CALCIUM SERPL-MCNC: 9.3 MG/DL (ref 8.8–10.2)
CHLORIDE SERPL-SCNC: 103 MMOL/L (ref 98–107)
CO2 SERPL-SCNC: 28 MMOL/L (ref 22–29)
CREAT SERPL-MCNC: 1 MG/DL (ref 0.7–1.2)
D-DIMER QUANTITATIVE: <200 NG/ML DDU (ref 0–230)
EKG ATRIAL RATE: 81 BPM
EKG P AXIS: 76 DEGREES
EKG P-R INTERVAL: 140 MS
EKG Q-T INTERVAL: 376 MS
EKG QRS DURATION: 72 MS
EKG QTC CALCULATION (BAZETT): 436 MS
EKG R AXIS: 23 DEGREES
EKG T AXIS: 54 DEGREES
EKG VENTRICULAR RATE: 81 BPM
EOSINOPHIL # BLD: 0.68 K/UL (ref 0.05–0.5)
EOSINOPHILS RELATIVE PERCENT: 15 % (ref 0–6)
ERYTHROCYTE [DISTWIDTH] IN BLOOD BY AUTOMATED COUNT: 11.9 % (ref 11.5–15)
GFR, ESTIMATED: 85 ML/MIN/1.73M2
GLUCOSE SERPL-MCNC: 88 MG/DL (ref 74–99)
HCT VFR BLD AUTO: 42 % (ref 37–54)
HGB BLD-MCNC: 13.6 G/DL (ref 12.5–16.5)
IMM GRANULOCYTES # BLD AUTO: <0.03 K/UL (ref 0–0.58)
IMM GRANULOCYTES NFR BLD: 0 % (ref 0–5)
INFLUENZA A BY PCR: NOT DETECTED
INFLUENZA B BY PCR: NOT DETECTED
LYMPHOCYTES NFR BLD: 1.47 K/UL (ref 1.5–4)
LYMPHOCYTES RELATIVE PERCENT: 32 % (ref 20–42)
MCH RBC QN AUTO: 27.8 PG (ref 26–35)
MCHC RBC AUTO-ENTMCNC: 32.4 G/DL (ref 32–34.5)
MCV RBC AUTO: 85.7 FL (ref 80–99.9)
MONOCYTES NFR BLD: 0.48 K/UL (ref 0.1–0.95)
MONOCYTES NFR BLD: 10 % (ref 2–12)
NEUTROPHILS NFR BLD: 42 % (ref 43–80)
NEUTS SEG NFR BLD: 1.96 K/UL (ref 1.8–7.3)
PLATELET # BLD AUTO: 282 K/UL (ref 130–450)
PMV BLD AUTO: 8.7 FL (ref 7–12)
POTASSIUM SERPL-SCNC: 3.6 MMOL/L (ref 3.5–5.1)
PROT SERPL-MCNC: 7.3 G/DL (ref 6.4–8.3)
RBC # BLD AUTO: 4.9 M/UL (ref 3.8–5.8)
SARS-COV-2 RDRP RESP QL NAA+PROBE: NOT DETECTED
SODIUM SERPL-SCNC: 142 MMOL/L (ref 136–145)
SPECIMEN DESCRIPTION: NORMAL
TROPONIN I SERPL HS-MCNC: 13 NG/L (ref 0–22)
TROPONIN I SERPL HS-MCNC: 22 NG/L (ref 0–22)
WBC OTHER # BLD: 4.6 K/UL (ref 4.5–11.5)

## 2025-07-02 PROCEDURE — 85025 COMPLETE CBC W/AUTO DIFF WBC: CPT

## 2025-07-02 PROCEDURE — 85379 FIBRIN DEGRADATION QUANT: CPT

## 2025-07-02 PROCEDURE — 87502 INFLUENZA DNA AMP PROBE: CPT

## 2025-07-02 PROCEDURE — 80053 COMPREHEN METABOLIC PANEL: CPT

## 2025-07-02 PROCEDURE — 71045 X-RAY EXAM CHEST 1 VIEW: CPT

## 2025-07-02 PROCEDURE — 93010 ELECTROCARDIOGRAM REPORT: CPT | Performed by: INTERNAL MEDICINE

## 2025-07-02 PROCEDURE — 87635 SARS-COV-2 COVID-19 AMP PRB: CPT

## 2025-07-02 PROCEDURE — 83880 ASSAY OF NATRIURETIC PEPTIDE: CPT

## 2025-07-02 PROCEDURE — 84484 ASSAY OF TROPONIN QUANT: CPT

## 2025-07-02 PROCEDURE — 99285 EMERGENCY DEPT VISIT HI MDM: CPT

## 2025-07-02 PROCEDURE — 93005 ELECTROCARDIOGRAM TRACING: CPT | Performed by: STUDENT IN AN ORGANIZED HEALTH CARE EDUCATION/TRAINING PROGRAM

## 2025-07-02 PROCEDURE — 6360000002 HC RX W HCPCS: Performed by: STUDENT IN AN ORGANIZED HEALTH CARE EDUCATION/TRAINING PROGRAM

## 2025-07-02 PROCEDURE — 96374 THER/PROPH/DIAG INJ IV PUSH: CPT

## 2025-07-02 PROCEDURE — 6370000000 HC RX 637 (ALT 250 FOR IP): Performed by: STUDENT IN AN ORGANIZED HEALTH CARE EDUCATION/TRAINING PROGRAM

## 2025-07-02 RX ORDER — DOXYCYCLINE HYCLATE 100 MG
100 TABLET ORAL 2 TIMES DAILY
Qty: 14 TABLET | Refills: 0 | Status: SHIPPED | OUTPATIENT
Start: 2025-07-02 | End: 2025-07-09

## 2025-07-02 RX ORDER — METHYLPREDNISOLONE SODIUM SUCCINATE 125 MG/2ML
125 INJECTION INTRAMUSCULAR; INTRAVENOUS ONCE
Status: COMPLETED | OUTPATIENT
Start: 2025-07-02 | End: 2025-07-02

## 2025-07-02 RX ORDER — PREDNISONE 50 MG/1
50 TABLET ORAL DAILY
Qty: 5 TABLET | Refills: 0 | Status: SHIPPED | OUTPATIENT
Start: 2025-07-02 | End: 2025-07-07

## 2025-07-02 RX ORDER — IPRATROPIUM BROMIDE AND ALBUTEROL SULFATE 2.5; .5 MG/3ML; MG/3ML
1 SOLUTION RESPIRATORY (INHALATION)
Status: COMPLETED | OUTPATIENT
Start: 2025-07-02 | End: 2025-07-02

## 2025-07-02 RX ORDER — ALBUTEROL SULFATE 90 UG/1
2 INHALANT RESPIRATORY (INHALATION) 4 TIMES DAILY PRN
Qty: 18 G | Refills: 0 | Status: SHIPPED | OUTPATIENT
Start: 2025-07-02

## 2025-07-02 RX ADMIN — METHYLPREDNISOLONE SODIUM SUCCINATE 125 MG: 125 INJECTION INTRAMUSCULAR; INTRAVENOUS at 04:30

## 2025-07-02 RX ADMIN — IPRATROPIUM BROMIDE AND ALBUTEROL SULFATE 1 DOSE: .5; 2.5 SOLUTION RESPIRATORY (INHALATION) at 04:30

## 2025-07-02 RX ADMIN — IPRATROPIUM BROMIDE AND ALBUTEROL SULFATE 1 DOSE: .5; 2.5 SOLUTION RESPIRATORY (INHALATION) at 04:25

## 2025-07-02 RX ADMIN — IPRATROPIUM BROMIDE AND ALBUTEROL SULFATE 1 DOSE: .5; 2.5 SOLUTION RESPIRATORY (INHALATION) at 04:27

## 2025-07-02 ASSESSMENT — PAIN SCALES - GENERAL: PAINLEVEL_OUTOF10: 7

## 2025-07-02 ASSESSMENT — LIFESTYLE VARIABLES
HOW OFTEN DO YOU HAVE A DRINK CONTAINING ALCOHOL: 2-4 TIMES A MONTH
HOW MANY STANDARD DRINKS CONTAINING ALCOHOL DO YOU HAVE ON A TYPICAL DAY: 3 OR 4

## 2025-07-02 ASSESSMENT — PAIN DESCRIPTION - LOCATION: LOCATION: CHEST

## 2025-07-02 NOTE — ED PROVIDER NOTES
[facility] EMERGENCY DEPARTMENT  EMERGENCY DEPARTMENT ENCOUNTER        Pt Name: Koby Lee  MRN: [id number]  Birthdate 1960  Date of evaluation: 7/2/2025  Provider: Barbara Vargas DO  PCP: Gurvinder Barnett MD  Note Started: 4:11 AM EDT 7/2/25    CHIEF COMPLAINT       Chief Complaint   Patient presents with    Shortness of Breath    Chest Pain       HISTORY OF PRESENT ILLNESS: 1 or more Elements   History From: patient    Limitations to history : None    Koby Lee is a 64 y.o. male with a history of smoking presenting emerged part complaining of shortness of breath and chest pain.  Patient states that today he was laying down to go to sleep and felt short of breath.  Patient states that he is a former smoker and smokes very regularly but cut back on smoking last week.  However he states that he started vaping instead.  States that he does not take any medications at home and does not follow-up with a doctor regularly.  Patient states that he has had a slight cough productive of yellow sputum.  He denies any lightheadedness, dizziness, syncope, palpitations, abdominal pain, nausea, vomiting, diarrhea, fall, trauma, recent hospitalization, recent was, or other acute symptoms or concerns.    Nursing Notes were all reviewed and agreed with or any disagreements were addressed in the HPI.    REVIEW OF SYSTEMS :      Review of Systems    Positives and Pertinent negatives as per HPI.     SURGICAL HISTORY   No past surgical history on file.    CURRENTMEDICATIONS       Previous Medications    No medications on file       ALLERGIES     Patient has no known allergies.    FAMILYHISTORY     No family history on file.     SOCIAL HISTORY       Social History     Tobacco Use    Smoking status: Every Day     Current packs/day: 1.00     Types: Cigarettes    Smokeless tobacco: Never   Substance Use Topics    Alcohol use: Yes    Drug use: No       SCREENINGS

## 2025-07-02 NOTE — DISCHARGE INSTRUCTIONS
Follow-up with family doctor.  Complete course of steroids and doxycycline and use albuterol inhaler.  Stop smoking.  Return for any significant worsening symptoms or other acute symptoms or concerns.